# Patient Record
Sex: MALE | Race: BLACK OR AFRICAN AMERICAN | NOT HISPANIC OR LATINO | Employment: FULL TIME | URBAN - METROPOLITAN AREA
[De-identification: names, ages, dates, MRNs, and addresses within clinical notes are randomized per-mention and may not be internally consistent; named-entity substitution may affect disease eponyms.]

---

## 2017-05-17 ENCOUNTER — OFFICE VISIT (OUTPATIENT)
Dept: FAMILY MEDICINE | Facility: CLINIC | Age: 37
End: 2017-05-17
Payer: COMMERCIAL

## 2017-05-17 VITALS
SYSTOLIC BLOOD PRESSURE: 114 MMHG | WEIGHT: 228.19 LBS | DIASTOLIC BLOOD PRESSURE: 80 MMHG | BODY MASS INDEX: 33.8 KG/M2 | HEART RATE: 64 BPM | HEIGHT: 69 IN | TEMPERATURE: 98 F | OXYGEN SATURATION: 97 %

## 2017-05-17 DIAGNOSIS — M79.89 CYST OF SOFT TISSUE: ICD-10-CM

## 2017-05-17 DIAGNOSIS — L23.9 ALLERGIC DERMATITIS: Primary | ICD-10-CM

## 2017-05-17 PROCEDURE — 99213 OFFICE O/P EST LOW 20 MIN: CPT | Mod: S$GLB,,, | Performed by: FAMILY MEDICINE

## 2017-05-17 PROCEDURE — 1160F RVW MEDS BY RX/DR IN RCRD: CPT | Mod: S$GLB,,, | Performed by: FAMILY MEDICINE

## 2017-05-17 PROCEDURE — 99999 PR PBB SHADOW E&M-EST. PATIENT-LVL III: CPT | Mod: PBBFAC,,, | Performed by: FAMILY MEDICINE

## 2017-05-17 RX ORDER — TRIAMCINOLONE ACETONIDE 5 MG/G
CREAM TOPICAL 2 TIMES DAILY
Qty: 30 G | Refills: 0 | Status: SHIPPED | OUTPATIENT
Start: 2017-05-17 | End: 2018-03-13

## 2017-05-17 NOTE — PROGRESS NOTES
"Subjective:       Patient ID: Avelino Lyle is a 36 y.o. male.    Chief Complaint: Check knot on back and Rash Left Ear/ Itching    patient presents with concerns of a mass on his back. He states it feels smaller. He states he squeezed it and some of it came out. He state it is not painful.     He also has a rash at the top of his left ear that is pruritic. He has not tried anything to rid himself of this. He denies known contact with new lotions, creams, or detergents that my have caused this.       Review of Systems    Objective:       Vitals:    05/17/17 1111   BP: 114/80   Pulse: 64   Temp: 98.4 °F (36.9 °C)   TempSrc: Oral   SpO2: 97%   Weight: 103.5 kg (228 lb 2.8 oz)   Height: 5' 9" (1.753 m)       Physical Exam   Constitutional: He appears well-developed and well-nourished. No distress.   HENT:   Head: Normocephalic and atraumatic.   Skin: Rash noted. He is not diaphoretic.            Assessment:       1. Allergic dermatitis    2. Cyst of soft tissue        Plan:       .Avelino was seen today for check knot on back and rash left ear/ itching.    Diagnoses and all orders for this visit:    Allergic dermatitis  -     triamcinolone acetonide 0.5% (KENALOG) 0.5 % Crea; Apply topically 2 (two) times daily.    Cyst of soft tissue  He can have this removed it becomes painful or uncomfortable.        "

## 2018-03-13 ENCOUNTER — OFFICE VISIT (OUTPATIENT)
Dept: FAMILY MEDICINE | Facility: CLINIC | Age: 38
End: 2018-03-13
Payer: COMMERCIAL

## 2018-03-13 VITALS
WEIGHT: 220.69 LBS | RESPIRATION RATE: 16 BRPM | BODY MASS INDEX: 32.69 KG/M2 | TEMPERATURE: 99 F | HEIGHT: 69 IN | OXYGEN SATURATION: 99 % | HEART RATE: 64 BPM | SYSTOLIC BLOOD PRESSURE: 116 MMHG | DIASTOLIC BLOOD PRESSURE: 80 MMHG

## 2018-03-13 DIAGNOSIS — L08.9 INFECTED CYST OF SKIN: Primary | ICD-10-CM

## 2018-03-13 DIAGNOSIS — L72.9 INFECTED CYST OF SKIN: Primary | ICD-10-CM

## 2018-03-13 PROCEDURE — 99214 OFFICE O/P EST MOD 30 MIN: CPT | Mod: 25,S$GLB,, | Performed by: PHYSICIAN ASSISTANT

## 2018-03-13 PROCEDURE — 10060 I&D ABSCESS SIMPLE/SINGLE: CPT | Mod: S$GLB,,, | Performed by: PHYSICIAN ASSISTANT

## 2018-03-13 PROCEDURE — 99999 PR PBB SHADOW E&M-EST. PATIENT-LVL IV: CPT | Mod: PBBFAC,,, | Performed by: PHYSICIAN ASSISTANT

## 2018-03-13 RX ORDER — SULFAMETHOXAZOLE AND TRIMETHOPRIM 800; 160 MG/1; MG/1
1 TABLET ORAL 2 TIMES DAILY
Qty: 20 TABLET | Refills: 0 | Status: SHIPPED | OUTPATIENT
Start: 2018-03-13 | End: 2018-03-23

## 2018-03-13 RX ORDER — IBUPROFEN 800 MG/1
800 TABLET ORAL EVERY 6 HOURS PRN
Qty: 40 TABLET | Refills: 0 | Status: SHIPPED | OUTPATIENT
Start: 2018-03-13 | End: 2018-03-23

## 2018-03-13 NOTE — PATIENT INSTRUCTIONS
Abscess (Incision & Drainage)  An abscess is sometimes called a boil. It happens when bacteria get trapped under the skin and start to grow. Pus forms inside the abscess as the body responds to the bacteria. An abscess can happen with an insect bite, ingrown hair, blocked oil gland, pimple, cyst, or puncture wound.  Your healthcare provider has drained the pus from your abscess. If the abscess pocket was large, your healthcare provider may have put in gauze packing. Your provider will need to remove it on your next visit. He or she may also replace it at that time. You may not need antibiotics to treat a simple abscess, unless the infection is spreading into the skin around the wound (cellulitis).  The wound will take about 1 to 2 weeks to heal, depending on the size of the abscess. Healthy tissue will grow from the bottom and sides of the opening until it seals over.  Home care  These tips can help your wound heal:  · The wound may drain for the first 2 days. Cover the wound with a clean dry dressing. Change the dressing if it becomes soaked with blood or pus.  · If a gauze packing was placed inside the abscess pocket, you may be told to remove it yourself. You may do this in the shower. Once the packing is removed, you should wash the area in the shower, or clean the area as directed by your provider. Continue to do this until the skin opening has closed. Make sure you wash your hands after changing the packing or cleaning the wound.  · If you were prescribed antibiotics, take them as directed until they are all gone.  · You may use acetaminophen or ibuprofen to control pain, unless another pain medicine was prescribed. If you have liver disease or ever had a stomach ulcer, talk with your doctor before using these medicines.  Follow-up care  Follow up with your healthcare provider, or as advised. If a gauze packing was put in your wound, it should be removed in 1 to 2 days. Check your wound every day for any  signs that the infection is getting worse. The signs are listed below.  When to seek medical advice  Call your healthcare provider right away if any of these occur:  · Increasing redness or swelling  · Red streaks in the skin leading away from the wound  · Increasing local pain or swelling  · Continued pus draining from the wound 2 days after treatment  · Fever of 100.4ºF (38ºC) or higher, or as directed by your healthcare provider  · Boil returns when you are at home  Date Last Reviewed: 9/1/2016  © 5477-8691 [employer]. [address] [place] [address]. All rights reserved. This information is not intended as a substitute for professional medical care. Always follow your healthcare professional's instructions.

## 2018-03-13 NOTE — PROGRESS NOTES
"Subjective:       Patient ID: Avelino Lyle is a 37 y.o. male with multiple medical diagnoses as listed in the medical history and problem list that presents for abcess (on back burst sunday, still draining)  .    Chief Complaint: abcess (on back burst sunday, still draining)      Abscess   Chronicity:  ChronicProgression Since Onset: gradually worsening  Abscess location: back   Associated Symptoms: no fever, no chills  Characteristics: draining and painful    Pain Scale:  8/10  Ineffective treatments: hot towel and squeezing ; peroxide.    Review of Systems   Constitutional: Negative for chills and fever.   Skin: Positive for wound.   Neurological: Negative for headaches.         PAST MEDICAL HISTORY:  History reviewed. No pertinent past medical history.    SOCIAL HISTORY:  Social History     Social History    Marital status:      Spouse name: N/A    Number of children: N/A    Years of education: N/A     Occupational History     [employer]     Social History Main Topics    Smoking status: Never Smoker    Smokeless tobacco: Not on file    Alcohol use No    Drug use: No    Sexual activity: Yes     Partners: Female     Other Topics Concern    Not on file     Social History Narrative    No narrative on file       ALLERGIES AND MEDICATIONS: updated and reviewed.  Review of patient's allergies indicates:   Allergen Reactions    Iodine and iodide containing products Anaphylaxis     Current Outpatient Prescriptions   Medication Sig Dispense Refill    ibuprofen (ADVIL,MOTRIN) 800 MG tablet Take 1 tablet (800 mg total) by mouth every 6 (six) hours as needed for Pain. 40 tablet 0    sulfamethoxazole-trimethoprim 800-160mg (BACTRIM DS) 800-160 mg Tab Take 1 tablet by mouth 2 (two) times daily. 20 tablet 0     No current facility-administered medications for this visit.          Objective:   /80   Pulse 64   Temp 98.6 °F (37 °C) (Oral)   Resp 16   Ht 5' 9" (1.753 m)   Wt 100.1 kg " (220 lb 10.9 oz)   SpO2 99%   BMI 32.59 kg/m²      Physical Exam   Constitutional: He is oriented to person, place, and time. No distress.   Cardiovascular: Normal rate and regular rhythm.    Pulmonary/Chest: Effort normal and breath sounds normal.   Musculoskeletal: Normal range of motion.   Neurological: He is alert and oriented to person, place, and time.   Skin: Rash noted.         After obtaining informed written consent, the upper mid back area was clean with chroloprep and draped in sterile fashion.  1% lidocaine with epi was used to anesthetize the area. An [11 ] blade was then used to make an incision and minimal [bloody] drainage was expressed. Capsule was removed. The area was then dressed with gauze and telfa.  Pt tolerated procedure well. Wound care instructions given verbally.       Assessment:       1. Infected cyst of skin        Plan:       Infected cyst of skin  -     sulfamethoxazole-trimethoprim 800-160mg (BACTRIM DS) 800-160 mg Tab; Take 1 tablet by mouth 2 (two) times daily.  Dispense: 20 tablet; Refill: 0  -     ibuprofen (ADVIL,MOTRIN) 800 MG tablet; Take 1 tablet (800 mg total) by mouth every 6 (six) hours as needed for Pain.  Dispense: 40 tablet; Refill: 0    return in 2 days.   Advised to go home eat and take medications.         No Follow-up on file.

## 2018-03-13 NOTE — LETTER
March 13, 2018         [facility]  Family Medicine  [address]  [address]  [place] [address]  Phone: [phone]  Fax: [phone]   March 13, 2018     Patient: Avelino Lyle   YOB: 1980   Date of Visit: 3/13/2018       To Whom it May Concern:    Avelino Lyle was seen in my clinic on 3/13/2018. He may return to work on 3/16/18.    If you have any questions or concerns, please don't hesitate to call.    Sincerely,         ADRIEL Nicholson

## 2018-03-15 ENCOUNTER — OFFICE VISIT (OUTPATIENT)
Dept: FAMILY MEDICINE | Facility: CLINIC | Age: 38
End: 2018-03-15
Payer: COMMERCIAL

## 2018-03-15 ENCOUNTER — TELEPHONE (OUTPATIENT)
Dept: FAMILY MEDICINE | Facility: CLINIC | Age: 38
End: 2018-03-15

## 2018-03-15 VITALS — BODY MASS INDEX: 32.33 KG/M2 | HEIGHT: 69 IN | WEIGHT: 218.25 LBS | TEMPERATURE: 98 F

## 2018-03-15 DIAGNOSIS — L08.9 INFECTED CYST OF SKIN: Primary | ICD-10-CM

## 2018-03-15 DIAGNOSIS — L72.9 INFECTED CYST OF SKIN: Primary | ICD-10-CM

## 2018-03-15 PROCEDURE — 99024 POSTOP FOLLOW-UP VISIT: CPT | Mod: S$GLB,,, | Performed by: PHYSICIAN ASSISTANT

## 2018-03-15 PROCEDURE — 99999 PR PBB SHADOW E&M-EST. PATIENT-LVL II: CPT | Mod: PBBFAC,,, | Performed by: PHYSICIAN ASSISTANT

## 2018-03-15 NOTE — TELEPHONE ENCOUNTER
----- Message from Emily Mondragon sent at 3/15/2018 12:51 PM CDT -----  Contact: Self  Pt called to reschedule post-op. Pt can be reached @ [phone].

## 2018-03-15 NOTE — LETTER
March 15, 2018                 [facility]  Family Medicine  [address]  [address]  [place] [address]  Phone: [phone]  Fax: [phone]   March 15, 2018     Patient: Avelino Lyle   YOB: 1980   Date of Visit: 3/15/2018       To Whom it May Concern:    Avelino Lyle was seen in my clinic on 3/15/2018. He may return to work on 3/19/18.    If you have any questions or concerns, please don't hesitate to call.    Sincerely,         ADRIEL Nicholson

## 2018-03-15 NOTE — PROGRESS NOTES
"Subjective:       Patient ID: Avelino Lyle is a 37 y.o. male with multiple medical diagnoses as listed in the medical history and problem list that presents for Wound Check  .    Chief Complaint: Wound Check      HPI   Pt here to follow up for infected cyst. The cyst had drained prior to visit but patient still with pain so we went ahead to remove the capsule. He has been on bactrim. He states it was still draining when he removed his dressing. He is still in pain and the ibuprofen 800 is not helping.     Review of Systems   Constitutional: Negative for appetite change, fatigue and fever.   Skin: Positive for wound (pain ).         PAST MEDICAL HISTORY:  History reviewed. No pertinent past medical history.    SOCIAL HISTORY:  Social History     Social History    Marital status:      Spouse name: N/A    Number of children: N/A    Years of education: N/A     Occupational History     [employer]     Social History Main Topics    Smoking status: Never Smoker    Smokeless tobacco: Not on file    Alcohol use No    Drug use: No    Sexual activity: Yes     Partners: Female     Other Topics Concern    Not on file     Social History Narrative    No narrative on file       ALLERGIES AND MEDICATIONS: updated and reviewed.  Review of patient's allergies indicates:   Allergen Reactions    Iodine and iodide containing products Anaphylaxis     Current Outpatient Prescriptions   Medication Sig Dispense Refill    ibuprofen (ADVIL,MOTRIN) 800 MG tablet Take 1 tablet (800 mg total) by mouth every 6 (six) hours as needed for Pain. 40 tablet 0    sulfamethoxazole-trimethoprim 800-160mg (BACTRIM DS) 800-160 mg Tab Take 1 tablet by mouth 2 (two) times daily. 20 tablet 0     No current facility-administered medications for this visit.          Objective:   Temp 97.9 °F (36.6 °C) (Oral)   Ht 5' 9" (1.753 m)   Wt 99 kg (218 lb 4.1 oz)   BMI 32.23 kg/m²      Physical Exam      The area is tender to " touch.  No warmth or induration.       Assessment:       1. Infected cyst of skin        Plan:       Infected cyst of skin  Remain out of work until Monday.  Continue bactrim.   Will get DR. Bowens to send him some tramadol for the pain.           No Follow-up on file.

## 2018-03-16 RX ORDER — TRAMADOL HYDROCHLORIDE 50 MG/1
50 TABLET ORAL EVERY 6 HOURS PRN
Qty: 20 TABLET | Refills: 0 | Status: SHIPPED | OUTPATIENT
Start: 2018-03-16 | End: 2018-03-26

## 2018-06-04 ENCOUNTER — OFFICE VISIT (OUTPATIENT)
Dept: FAMILY MEDICINE | Facility: CLINIC | Age: 38
End: 2018-06-04
Payer: COMMERCIAL

## 2018-06-04 VITALS
TEMPERATURE: 99 F | DIASTOLIC BLOOD PRESSURE: 78 MMHG | WEIGHT: 213.19 LBS | HEIGHT: 69 IN | BODY MASS INDEX: 31.58 KG/M2 | SYSTOLIC BLOOD PRESSURE: 130 MMHG | OXYGEN SATURATION: 99 % | HEART RATE: 70 BPM

## 2018-06-04 DIAGNOSIS — M75.52 ACUTE SHOULDER BURSITIS, LEFT: Primary | ICD-10-CM

## 2018-06-04 PROCEDURE — 3008F BODY MASS INDEX DOCD: CPT | Mod: CPTII,S$GLB,, | Performed by: FAMILY MEDICINE

## 2018-06-04 PROCEDURE — 96372 THER/PROPH/DIAG INJ SC/IM: CPT | Mod: S$GLB,,, | Performed by: FAMILY MEDICINE

## 2018-06-04 PROCEDURE — 99999 PR PBB SHADOW E&M-EST. PATIENT-LVL III: CPT | Mod: PBBFAC,,, | Performed by: FAMILY MEDICINE

## 2018-06-04 PROCEDURE — 99214 OFFICE O/P EST MOD 30 MIN: CPT | Mod: 25,S$GLB,, | Performed by: FAMILY MEDICINE

## 2018-06-04 RX ORDER — METHYLPREDNISOLONE ACETATE 80 MG/ML
80 INJECTION, SUSPENSION INTRA-ARTICULAR; INTRALESIONAL; INTRAMUSCULAR; SOFT TISSUE
Status: COMPLETED | OUTPATIENT
Start: 2018-06-04 | End: 2018-06-04

## 2018-06-04 RX ORDER — METHYLPREDNISOLONE 4 MG/1
TABLET ORAL
Qty: 1 PACKAGE | Refills: 0 | Status: SHIPPED | OUTPATIENT
Start: 2018-06-04 | End: 2018-08-12

## 2018-06-04 RX ADMIN — METHYLPREDNISOLONE ACETATE 80 MG: 80 INJECTION, SUSPENSION INTRA-ARTICULAR; INTRALESIONAL; INTRAMUSCULAR; SOFT TISSUE at 02:06

## 2018-06-04 NOTE — PROGRESS NOTES
"Subjective:       Patient ID: Avelino Lyle is a 37 y.o. male.    Chief Complaint: Left Arm/ Shoulder Pain    Shoulder Pain    The pain is present in the left shoulder. This is a new (LEFT SHOULDER PAIN WITH LEANIN ON THE LEFT SHOULDER. HE WORKS AS A  AND HAS SWELLING AND LIMITED RANGE OF MOTION) problem. The current episode started in the past 7 days. There has been no history of extremity trauma. The pain is moderate. Associated symptoms include a limited range of motion and stiffness. Pertinent negatives include no inability to bear weight. The treatment provided no relief.     Review of Systems   Musculoskeletal: Positive for stiffness.       Objective:       Vitals:    06/04/18 1336   BP: 130/78   Pulse: 70   Temp: 98.8 °F (37.1 °C)   TempSrc: Oral   SpO2: 99%   Weight: 96.7 kg (213 lb 3 oz)   Height: 5' 9" (1.753 m)       Physical Exam   Constitutional: He is oriented to person, place, and time. He appears well-developed and well-nourished. No distress.   HENT:   Head: Normocephalic and atraumatic.   Neck: Normal range of motion. Neck supple.   Musculoskeletal:        Left shoulder: He exhibits decreased range of motion, tenderness, bony tenderness, swelling, effusion and pain. He exhibits no deformity and normal strength.   Neurological: He is alert and oriented to person, place, and time.   Skin: He is not diaphoretic.       Assessment:       1. Acute shoulder bursitis, left        Plan:       Avelino was seen today for left arm/ shoulder pain.    Diagnoses and all orders for this visit:    Acute shoulder bursitis, left  -     methylPREDNISolone acetate injection 80 mg; Inject 1 mL (80 mg total) into the muscle one time.  -     methylPREDNISolone (MEDROL DOSEPACK) 4 mg tablet; use as directed    Other orders  -     Cancel: CBC auto differential; Future  -     Cancel: Comprehensive metabolic panel; Future  -     Cancel: Lipid panel; Future            [phone] FAX  "

## 2018-06-04 NOTE — MEDICAL/APP STUDENT
Subjective:       Patient ID: Avelino Lyle is a 37 y.o. male.    Chief Complaint: Left Arm/ Shoulder Pain    HPI   Mr. Lyle is a 36 yo M who presents with worsening left shoulder pain rated 10/10 that began 3 weeks ago. He works as a  which requires significant manual labor and lying on his left shoulder. He reports significant pain when lying on his left shoulder for work and sleep. He denies any previous shoulder pain. He denies taking any pain medication.     Review of Systems    No other significant symptoms.  Objective:       Vitals:    06/04/18 1336   BP: 130/78   Pulse: 70   Temp: 98.8 °F (37.1 °C)     Physical Exam    Constitutional: He appears well-developed and well-nourished. He appears older than his age.  Cardiovascular: Normal rate, regular rhythm, normal heart sounds and intact distal pulses.    Pulmonary/Chest: Effort normal and breath sounds normal.   Abdominal: Soft. Bowel sounds are normal.    Neurological: He is alert and oriented to person, place, and time.   Skin: No rash.  MSK: Swelling over left shoulder. Enlarged subacromial bursa palpated. Patient can't abduct passively or actively past 45 degrees without pain.  Positive Hawkin's sign and empty can sign in left shoulder.   Assessment:       36 yo M with L shoulder pain.  No diagnosis found.    Plan:       Left Subacromial Bursitis  1. Corticosteroid shot in clinic  2. Corticosteroid pack  3. One week off work  4. Rest, Ice, and Ibuprofen

## 2018-06-04 NOTE — PROGRESS NOTES
Administered Depo - Medrol 80 mg IM to left deltoid.  Patient tolerated injection well, no adverse reactions noted.

## 2018-08-12 ENCOUNTER — HOSPITAL ENCOUNTER (EMERGENCY)
Facility: HOSPITAL | Age: 38
Discharge: HOME | End: 2018-08-12
Attending: EMERGENCY MEDICINE
Payer: COMMERCIAL

## 2018-08-12 VITALS
OXYGEN SATURATION: 98 % | RESPIRATION RATE: 18 BRPM | BODY MASS INDEX: 34.07 KG/M2 | TEMPERATURE: 98 F | HEART RATE: 79 BPM | HEIGHT: 69 IN | SYSTOLIC BLOOD PRESSURE: 138 MMHG | DIASTOLIC BLOOD PRESSURE: 73 MMHG | WEIGHT: 230 LBS

## 2018-08-12 DIAGNOSIS — S01.01XA SCALP LACERATION, INITIAL ENCOUNTER: ICD-10-CM

## 2018-08-12 DIAGNOSIS — L72.3 INFECTED SEBACEOUS CYST OF SKIN: Primary | ICD-10-CM

## 2018-08-12 DIAGNOSIS — L08.9 INFECTED SEBACEOUS CYST OF SKIN: Primary | ICD-10-CM

## 2018-08-12 PROCEDURE — 25000003 PHARM REV CODE 250: Performed by: PHYSICIAN ASSISTANT

## 2018-08-12 PROCEDURE — 99283 EMERGENCY DEPT VISIT LOW MDM: CPT

## 2018-08-12 PROCEDURE — 99283 EMERGENCY DEPT VISIT LOW MDM: CPT | Mod: ,,, | Performed by: PHYSICIAN ASSISTANT

## 2018-08-12 RX ORDER — ACETAMINOPHEN 500 MG
1000 TABLET ORAL
Status: COMPLETED | OUTPATIENT
Start: 2018-08-12 | End: 2018-08-12

## 2018-08-12 RX ORDER — ACETAMINOPHEN 500 MG
1000 TABLET ORAL EVERY 6 HOURS PRN
Qty: 50 TABLET | Refills: 0 | Status: SHIPPED | OUTPATIENT
Start: 2018-08-12 | End: 2023-09-22

## 2018-08-12 RX ORDER — SULFAMETHOXAZOLE AND TRIMETHOPRIM 800; 160 MG/1; MG/1
1 TABLET ORAL 2 TIMES DAILY
Qty: 14 TABLET | Refills: 0 | Status: SHIPPED | OUTPATIENT
Start: 2018-08-12 | End: 2018-08-17

## 2018-08-12 RX ADMIN — ACETAMINOPHEN 1000 MG: 500 TABLET ORAL at 08:08

## 2018-08-13 NOTE — DISCHARGE INSTRUCTIONS
Please take entire course of Bactrim DS prescribed x 5 days. Take oral Tylenol as need for headache and scalp contusion. Follow up with your PCP following completion of antibiotics to arrange excision of cyst of back. Return to ED with any change or worsening in current condition.

## 2018-08-13 NOTE — ED PROVIDER NOTES
Encounter Date: 8/12/2018  38 y.o.      History     Chief Complaint   Patient presents with    Abscess     Pt states he has a cyst on his back that was removed previously and now present again with drainage starting on friday.     Head Injury     Pt states his back sigala of his vechicle came down and hit is posterior head. Pt c/o HA and laceration noted; no bleeding. Denies LOC or vision changes.      38 y.o. M with no significant PMH presents to the ED with chief complaint x 2, draining cyst of left upper back and scalp laceration from injury > 24 hours ago. He reports the upper back cyst is recurrent with I and D in 03/2018, 1 week ago he reports the cyst became inflamed and spontaneously drained x 3 days ago. Currently with cyst of Left upper back with minimal drainage with manipulation. There is no surrounding erythema, fluctuance, warmth and NTTP. He also reports laceration to scalp secondary to trunk closing on his head yesterday morning, denies LOC or vision disturbances. Neuro examination is normal with no amnesia, ataxia, cranial or sensory nerve deficits on exam. He reports a dull headache since noon today, but has not taken anything for relief. 3cm superficial laceration of posterior scalp in closed, no erythema or drainage. He does have TTP with mild edema surrounding the laceration. He denies chest pain, SOB, nausea, emesis, fatigue, malaise or confusion.           Review of patient's allergies indicates:   Allergen Reactions    Iodine and iodide containing products Anaphylaxis     History reviewed. No pertinent past medical history.  Past Surgical History:   Procedure Laterality Date    FEMUR SURGERY       Family History   Problem Relation Age of Onset    Diabetes Mother     Hypertension Mother      Social History     Tobacco Use    Smoking status: Never Smoker   Substance Use Topics    Alcohol use: No    Drug use: No     Review of Systems   Constitutional: Negative for activity change,  appetite change, chills, diaphoresis, fatigue and fever.   HENT: Negative for congestion, ear pain, facial swelling, sinus pain, trouble swallowing and voice change.    Eyes: Negative for photophobia, pain and visual disturbance.   Respiratory: Negative for apnea, chest tightness and shortness of breath.    Cardiovascular: Negative for chest pain and palpitations.   Gastrointestinal: Negative for abdominal pain.   Genitourinary: Negative for dysuria.   Musculoskeletal: Negative for arthralgias, gait problem, myalgias and neck pain.   Skin: Positive for wound (posterior scalp, upper back ). Negative for color change, pallor and rash.   Neurological: Positive for headaches. Negative for dizziness, tremors, seizures, syncope, facial asymmetry, speech difficulty, weakness, light-headedness and numbness.   Psychiatric/Behavioral: Negative for agitation and behavioral problems. The patient is not nervous/anxious.    All other systems reviewed and are negative.      Physical Exam     Initial Vitals [08/12/18 1926]   BP Pulse Resp Temp SpO2   138/73 79 18 98.3 °F (36.8 °C) 98 %      MAP       --         Physical Exam    Nursing note and vitals reviewed.  Constitutional: He appears well-developed and well-nourished. No distress.   HENT:   Head: Normocephalic and atraumatic.       3cm closed/superficial laceration.    Eyes: Conjunctivae and EOM are normal. Pupils are equal, round, and reactive to light.   Neck: Normal range of motion.   Cardiovascular: Normal rate, regular rhythm and normal heart sounds.   Pulmonary/Chest: Breath sounds normal. No respiratory distress. He has no wheezes. He has no rales.   Abdominal: Soft. Bowel sounds are normal. There is no tenderness.   Musculoskeletal: Normal range of motion.   Neurological: He is alert and oriented to person, place, and time. He has normal strength. He is not disoriented. He displays no atrophy and no tremor. No cranial nerve deficit or sensory deficit. He exhibits  normal muscle tone. He displays a negative Romberg sign. Coordination and gait normal.   Skin: Skin is warm, dry and intact. Capillary refill takes less than 2 seconds. Lesion (draining cyst - upper back) noted. No abrasion, no bruising, no ecchymosis, no rash and no abscess noted. Laceration: 3cm - posterior scalp/closed. No erythema. No pallor.        1cm x 1cm draining sebaceous cyst - right upper back    Psychiatric: He has a normal mood and affect.         ED Course   Procedures  Labs Reviewed - No data to display       Imaging Results    None          Medical Decision Making:   Initial Assessment:   38 y.o. M with no significant PMHx presents with with chief complaint x 2, draining sebaceous of right upper back and superficial laceration of posterior scalp secondary to trauma > 24 hours. Cyst is recurrent, he had and I and D in 03/2018 and was placed on PO Bactrim DS. No sign of active infection today, will prescribe PO Bactrim DS.     Posterior scalp laceration secondary to trauma from trunk that fell on his head yesterday morning. He denies LOC, vision disturbances or dizziness. Laceration is 3cm and superficial. There is no drainage or surrounding erythema. There is mild tenderness to palpation with mild edema. He reports a dull headache which started at noon today. He has not taken anything for his HA.    Considered concussion or head trauma but did not feel that CT scan was warranted at this time as he is greater than 24 hours from trauma and denies LOC, vision disturbances, amnesia, ataxia, nausea or emesis. Neuro exam is completely normal.     Tylenol 1000mg PO given in ED. Discharged home with prescription for Bactrim DS and Tylenol. Recommend he cover the scalp laceration and cyst with Band Aid when outdoors. Can apply Ice to scalp laceration, mindful to apply protective barrier between ice and skin. Recommend he follow up with his PCP for definitive treatment/excision of cyst.     I discussed the  care of this patient with my supervising MD.    Differential Diagnosis:   Abscess, infected sebaceous cyst, insect bite    Concussion, head injury, migraine                      Clinical Impression:   The primary encounter diagnosis was Infected sebaceous cyst of skin. A diagnosis of Scalp laceration, initial encounter was also pertinent to this visit.      Disposition:   Disposition: Discharged  Condition: Stable                        ADRIEL Wagoner  08/12/18 2059

## 2018-08-13 NOTE — ED NOTES
Patient identifiers verified and correct for Mr Lyle  C/C: Abscess and head injury, laceration to top head  APPEARANCE: awake and alert in NAD.  SKIN: warm, dry, small area to mid upper back with healed center with min redness or warmth, no evident drainage  MUSCULOSKELETAL: Patient moving all extremities spontaneously, no obvious swelling or deformities noted. Ambulates independently.  RESPIRATORY: Denies shortness of breath.Respirations unlabored.   CARDIAC: Denies CP, 2+ distal pulses; no peripheral edema  ABDOMEN: S/ND/NT, Denies nausea  : voids spontaneously, denies difficulty  Neurologic: AAO x 4; follows commands equal strength in all extremities; denies numbness/tingling. Denies dizziness Positive lihjtheaded, positive gen weakness, positive headache no open areas, deneis nausea.

## 2018-08-13 NOTE — ED TRIAGE NOTES
"Patient states his PCP cut a cyst off his back 2 months ago, after being on back for "years" came back  on Friday with pain and yellow drainage, denies fever. ALso concerned that ruiz of car hit the top of his head yesterday, positive headache, denies nausea, Denies LOC. Laceration to top of head  "

## 2020-09-09 ENCOUNTER — OFFICE VISIT (OUTPATIENT)
Dept: FAMILY MEDICINE | Facility: CLINIC | Age: 40
End: 2020-09-09
Payer: COMMERCIAL

## 2020-09-09 VITALS
SYSTOLIC BLOOD PRESSURE: 118 MMHG | HEIGHT: 69 IN | BODY MASS INDEX: 34.35 KG/M2 | DIASTOLIC BLOOD PRESSURE: 84 MMHG | OXYGEN SATURATION: 98 % | HEART RATE: 96 BPM | TEMPERATURE: 99 F | WEIGHT: 231.94 LBS

## 2020-09-09 DIAGNOSIS — B35.3 TINEA PEDIS OF BOTH FEET: ICD-10-CM

## 2020-09-09 DIAGNOSIS — B34.9 SYSTEMIC VIRAL ILLNESS: Primary | ICD-10-CM

## 2020-09-09 PROCEDURE — 99214 PR OFFICE/OUTPT VISIT, EST, LEVL IV, 30-39 MIN: ICD-10-PCS | Mod: S$GLB,,, | Performed by: INTERNAL MEDICINE

## 2020-09-09 PROCEDURE — 3008F PR BODY MASS INDEX (BMI) DOCUMENTED: ICD-10-PCS | Mod: CPTII,S$GLB,, | Performed by: INTERNAL MEDICINE

## 2020-09-09 PROCEDURE — 99999 PR PBB SHADOW E&M-EST. PATIENT-LVL III: CPT | Mod: PBBFAC,,, | Performed by: INTERNAL MEDICINE

## 2020-09-09 PROCEDURE — 99999 PR PBB SHADOW E&M-EST. PATIENT-LVL III: ICD-10-PCS | Mod: PBBFAC,,, | Performed by: INTERNAL MEDICINE

## 2020-09-09 PROCEDURE — 99214 OFFICE O/P EST MOD 30 MIN: CPT | Mod: S$GLB,,, | Performed by: INTERNAL MEDICINE

## 2020-09-09 PROCEDURE — 3008F BODY MASS INDEX DOCD: CPT | Mod: CPTII,S$GLB,, | Performed by: INTERNAL MEDICINE

## 2020-09-09 RX ORDER — ECONAZOLE NITRATE 10 MG/G
CREAM TOPICAL 2 TIMES DAILY
Qty: 30 G | Refills: 2 | Status: SHIPPED | OUTPATIENT
Start: 2020-09-09 | End: 2023-09-22

## 2020-09-09 RX ORDER — TRIAMCINOLONE ACETONIDE 1 MG/G
OINTMENT TOPICAL 2 TIMES DAILY
Qty: 30 G | Refills: 3 | Status: SHIPPED | OUTPATIENT
Start: 2020-09-09 | End: 2020-09-09

## 2020-09-09 NOTE — PROGRESS NOTES
SUBJECTIVE     Chief Complaint   Patient presents with    Follow-up       HPI  Avelino Lyle is a 40 y.o. male with multiple medical diagnoses as listed in the medical history and problem list that presents for evaluation of URI since 9/1/2020. Pt reports he developed a productive cough, chills, body aches, and a fever(temp of 110). Denied any night sweats. Pt has been taking Tessalon Perles and he had improvement of symptoms. Denies any recent travel. +sick contacts(nieces/nephews with cold). Of note, pt went to [facility] and tested negative for COVID19.    PAST MEDICAL HISTORY:  History reviewed. No pertinent past medical history.    PAST SURGICAL HISTORY:  Past Surgical History:   Procedure Laterality Date    FEMUR SURGERY         SOCIAL HISTORY:  Social History     Socioeconomic History    Marital status:      Spouse name: Not on file    Number of children: Not on file    Years of education: Not on file    Highest education level: Not on file   Occupational History     Employer: [employer]   Social Needs    Financial resource strain: Not on file    Food insecurity     Worry: Not on file     Inability: Not on file    Transportation needs     Medical: Not on file     Non-medical: Not on file   Tobacco Use    Smoking status: Never Smoker   Substance and Sexual Activity    Alcohol use: No    Drug use: No    Sexual activity: Yes     Partners: Female   Lifestyle    Physical activity     Days per week: Not on file     Minutes per session: Not on file    Stress: Not at all   Relationships    Social connections     Talks on phone: Not on file     Gets together: Not on file     Attends [employer] service: Not on file     Active member of club or organization: Not on file     Attends meetings of clubs or organizations: Not on file     Relationship status: Not on file   Other Topics Concern    Not on file   Social History Narrative    Not on file       FAMILY HISTORY:  Family  "History   Problem Relation Age of Onset    Diabetes Mother     Hypertension Mother        ALLERGIES AND MEDICATIONS: updated and reviewed.  Review of patient's allergies indicates:   Allergen Reactions    Iodine and iodide containing products Anaphylaxis     Current Outpatient Medications   Medication Sig Dispense Refill    acetaminophen (TYLENOL) 500 MG tablet Take 2 tablets (1,000 mg total) by mouth every 6 (six) hours as needed for Pain. (Patient not taking: Reported on 9/9/2020) 50 tablet 0    econazole nitrate 1 % cream Apply topically 2 (two) times daily. 30 g 2     No current facility-administered medications for this visit.        ROS  Review of Systems   Constitutional: Negative for chills and fever.   HENT: Negative for hearing loss and sore throat.    Eyes: Negative for visual disturbance.   Respiratory: Positive for cough. Negative for chest tightness, shortness of breath and wheezing.    Cardiovascular: Negative for chest pain, palpitations and leg swelling.   Gastrointestinal: Negative for abdominal pain, constipation, diarrhea, nausea and vomiting.   Genitourinary: Negative for dysuria, frequency and urgency.   Musculoskeletal: Negative for arthralgias, joint swelling and myalgias.   Skin: Negative for rash and wound.   Neurological: Negative for headaches.   Psychiatric/Behavioral: Negative for agitation and confusion. The patient is not nervous/anxious.          OBJECTIVE     Physical Exam  Vitals:    09/09/20 1406   BP: 118/84   Pulse: 96   Temp: 98.5 °F (36.9 °C)    Body mass index is 34.25 kg/m².  Weight: 105.2 kg (231 lb 14.8 oz)   Height: 5' 9" (175.3 cm)     Physical Exam  Constitutional:       General: He is not in acute distress.     Appearance: He is well-developed.   HENT:      Head: Normocephalic and atraumatic.      Right Ear: External ear normal.      Left Ear: External ear normal.      Nose: Nose normal.   Eyes:      General: No scleral icterus.        Right eye: No discharge.    "      Left eye: No discharge.      Conjunctiva/sclera: Conjunctivae normal.   Neck:      Musculoskeletal: Normal range of motion and neck supple.      Vascular: No JVD.      Trachea: No tracheal deviation.   Cardiovascular:      Rate and Rhythm: Normal rate and regular rhythm.      Heart sounds: Normal heart sounds. No murmur. No friction rub. No gallop.    Pulmonary:      Effort: Pulmonary effort is normal. No respiratory distress.      Breath sounds: Normal breath sounds. No wheezing.   Abdominal:      General: Bowel sounds are normal. There is no distension.      Palpations: Abdomen is soft. There is no mass.      Tenderness: There is no abdominal tenderness. There is no guarding or rebound.   Musculoskeletal: Normal range of motion.         General: No tenderness or deformity.   Skin:     General: Skin is warm and dry.      Findings: No erythema or rash.      Comments: Skin desquamation at plantar surface of toe webs   Neurological:      Mental Status: He is alert and oriented to person, place, and time.      Motor: No abnormal muscle tone.      Coordination: Coordination normal.   Psychiatric:         Behavior: Behavior normal.         Thought Content: Thought content normal.         Judgment: Judgment normal.           Health Maintenance       Date Due Completion Date    Hepatitis C Screening 1980 ---    Lipid Panel 1980 ---    HIV Screening 06/13/1996 ---    TETANUS VACCINE 06/13/1999 ---    Influenza Vaccine (1) 08/01/2020 ---            ASSESSMENT     40 y.o. male with     1. Systemic viral illness    2. Tinea pedis of both feet        PLAN:     1. Systemic viral illness  - Improved  - Continue symptomatic treatment with rest, increase fluid intake, tylenol or ibuprofen PRN fever(temp >/= 100.4) or body aches. Okay to take OTC antihistamines, i.e. Bendaryl, Claritin, Allegra, etc. as needed.  - Okay to gargle with warm, salt water or use throat lozenges as needed    2. Tinea pedis of both feet  -  Pt to resume antifungal as below  - econazole nitrate 1 % cream; Apply topically 2 (two) times daily.  Dispense: 30 g; Refill: 2        RTC in 1-2 weeks as needed for any acute worsening of current condition or failure to improve        Emilee Webb MD  09/09/2020 2:32 PM        No follow-ups on file.

## 2020-09-09 NOTE — LETTER
September 9, 2020      [address] [place] [address] [address] [place] [address]  Phone: [phone]  Fax: [phone]       Patient: Avelino Lyle   YOB: 1980  Date of Visit: 09/09/2020    To Whom It May Concern:    Francesca Lyle  was at [facility] on 09/09/2020. He may return to work/school on 9/14/2020 with no restrictions. If you have any questions or concerns, or if I can be of further assistance, please do not hesitate to contact me.    Sincerely,    Emilee Webb MD

## 2023-09-22 ENCOUNTER — OFFICE VISIT (OUTPATIENT)
Dept: FAMILY MEDICINE | Facility: CLINIC | Age: 43
End: 2023-09-22
Payer: COMMERCIAL

## 2023-09-22 VITALS
DIASTOLIC BLOOD PRESSURE: 92 MMHG | OXYGEN SATURATION: 96 % | HEART RATE: 73 BPM | SYSTOLIC BLOOD PRESSURE: 118 MMHG | WEIGHT: 226.44 LBS | TEMPERATURE: 99 F | BODY MASS INDEX: 33.44 KG/M2

## 2023-09-22 DIAGNOSIS — Z11.59 NEED FOR HEPATITIS C SCREENING TEST: ICD-10-CM

## 2023-09-22 DIAGNOSIS — R10.13 EPIGASTRIC PAIN: Primary | ICD-10-CM

## 2023-09-22 DIAGNOSIS — Z11.4 SCREENING FOR HIV (HUMAN IMMUNODEFICIENCY VIRUS): ICD-10-CM

## 2023-09-22 DIAGNOSIS — Z00.00 ANNUAL PHYSICAL EXAM: ICD-10-CM

## 2023-09-22 PROCEDURE — 3074F SYST BP LT 130 MM HG: CPT | Mod: CPTII,S$GLB,, | Performed by: INTERNAL MEDICINE

## 2023-09-22 PROCEDURE — 3074F PR MOST RECENT SYSTOLIC BLOOD PRESSURE < 130 MM HG: ICD-10-PCS | Mod: CPTII,S$GLB,, | Performed by: INTERNAL MEDICINE

## 2023-09-22 PROCEDURE — 99214 PR OFFICE/OUTPT VISIT, EST, LEVL IV, 30-39 MIN: ICD-10-PCS | Mod: S$GLB,,, | Performed by: INTERNAL MEDICINE

## 2023-09-22 PROCEDURE — 99999 PR PBB SHADOW E&M-EST. PATIENT-LVL IV: CPT | Mod: PBBFAC,,, | Performed by: INTERNAL MEDICINE

## 2023-09-22 PROCEDURE — 1160F RVW MEDS BY RX/DR IN RCRD: CPT | Mod: CPTII,S$GLB,, | Performed by: INTERNAL MEDICINE

## 2023-09-22 PROCEDURE — 99214 OFFICE O/P EST MOD 30 MIN: CPT | Mod: S$GLB,,, | Performed by: INTERNAL MEDICINE

## 2023-09-22 PROCEDURE — 3080F DIAST BP >= 90 MM HG: CPT | Mod: CPTII,S$GLB,, | Performed by: INTERNAL MEDICINE

## 2023-09-22 PROCEDURE — 3008F BODY MASS INDEX DOCD: CPT | Mod: CPTII,S$GLB,, | Performed by: INTERNAL MEDICINE

## 2023-09-22 PROCEDURE — 1160F PR REVIEW ALL MEDS BY PRESCRIBER/CLIN PHARMACIST DOCUMENTED: ICD-10-PCS | Mod: CPTII,S$GLB,, | Performed by: INTERNAL MEDICINE

## 2023-09-22 PROCEDURE — 99999 PR PBB SHADOW E&M-EST. PATIENT-LVL IV: ICD-10-PCS | Mod: PBBFAC,,, | Performed by: INTERNAL MEDICINE

## 2023-09-22 PROCEDURE — 1159F PR MEDICATION LIST DOCUMENTED IN MEDICAL RECORD: ICD-10-PCS | Mod: CPTII,S$GLB,, | Performed by: INTERNAL MEDICINE

## 2023-09-22 PROCEDURE — 1159F MED LIST DOCD IN RCRD: CPT | Mod: CPTII,S$GLB,, | Performed by: INTERNAL MEDICINE

## 2023-09-22 PROCEDURE — 3008F PR BODY MASS INDEX (BMI) DOCUMENTED: ICD-10-PCS | Mod: CPTII,S$GLB,, | Performed by: INTERNAL MEDICINE

## 2023-09-22 PROCEDURE — 3080F PR MOST RECENT DIASTOLIC BLOOD PRESSURE >= 90 MM HG: ICD-10-PCS | Mod: CPTII,S$GLB,, | Performed by: INTERNAL MEDICINE

## 2023-09-22 RX ORDER — PANTOPRAZOLE SODIUM 20 MG/1
20 TABLET, DELAYED RELEASE ORAL DAILY
Qty: 60 TABLET | Refills: 1 | Status: SHIPPED | OUTPATIENT
Start: 2023-09-22 | End: 2023-09-26 | Stop reason: ALTCHOICE

## 2023-09-22 RX ORDER — ACETAMINOPHEN 500 MG
500 TABLET ORAL EVERY 6 HOURS PRN
Qty: 120 TABLET | Refills: 1 | Status: SHIPPED | OUTPATIENT
Start: 2023-09-22

## 2023-09-22 NOTE — PROGRESS NOTES
Health Maintenance Due   Topic     Hepatitis C Screening  Consult pcp    Lipid Panel  Consult pcp    COVID-19 Vaccine (1) Not offered at this office    HIV Screening  Consult pcp    TETANUS VACCINE      Influenza Vaccine (1) Pt decline

## 2023-09-22 NOTE — LETTER
September 22, 2023    Avelino Lyle  [address] [facility] [address]             [facility]  [address] [place] [address]  Phone: [phone]  Fax: [phone]   September 22, 2023     Patient: Avelino Lyle   YOB: 1980   Date of Visit: 9/22/2023       To Whom it May Concern:    Avelino Lyle was seen in person on 9/22/2023. He may return to work on 9/26/2023 .    Please excuse him from any classes or work missed.    If you have any questions or concerns, please don't hesitate to call.    Sincerely,         Dariela Upton MD

## 2023-09-22 NOTE — PROGRESS NOTES
Chief Complaint: Abdominal Pain (Pt has been experiencing pain for about a week. Hasn't taken any medication for the pain)      Avelino Lyle  is a 43 y.o. year old with a PMH of  has no past medical history on file. who presents today for    Abdominal pain: Severity 10/10 during episodes, multiple episodes a day, mostly at night/after dinner/moving any position. Going on for a week. Has not taken any meds. Nothing has helped the pain. No new food or meds. No nausea, diarrhea, constipation. Does not look at stool, so not sure if has melena or blood in stool. First time this has happened. Generally getting worse. Took off from work for this. No radiating pain. Pressure like pain. Food relieves the pain. No abdominal surgeries in the past. No congenital issues. Does not smoke, no hx of HTN or DM.     Past Surgical History:   Procedure Laterality Date    FEMUR SURGERY          Family History   Problem Relation Age of Onset    Diabetes Mother     Hypertension Mother         Social History     Socioeconomic History    Marital status:    Occupational History     Employer: [employer]   Tobacco Use    Smoking status: Never   Substance and Sexual Activity    Alcohol use: No    Drug use: No    Sexual activity: Yes     Partners: Female     Social Determinants of Health     Stress: No Stress Concern Present (9/9/2020)    [facility] of Occupational Health - Occupational Stress Questionnaire     Feeling of Stress : Not at all         Current Outpatient Medications:     acetaminophen (TYLENOL) 500 MG tablet, Take 1 tablet (500 mg total) by mouth every 6 (six) hours as needed for Pain., Disp: 120 tablet, Rfl: 1    pantoprazole (PROTONIX) 20 MG tablet, Take 1 tablet (20 mg total) by mouth once daily., Disp: 60 tablet, Rfl: 1     Review of Systems   Constitutional:  Negative for chills, diaphoresis, fever, malaise/fatigue and weight loss.   Respiratory:  Negative for cough and shortness of breath.     Cardiovascular:  Negative for chest pain and palpitations.   Gastrointestinal:  Positive for abdominal pain. Negative for constipation, diarrhea, heartburn and nausea.   Genitourinary:  Negative for dysuria.   Musculoskeletal:  Negative for joint pain and myalgias.   Neurological:  Negative for dizziness and headaches.        Objective:      Vitals:    09/22/23 1301   BP: (!) 118/92   Pulse: 73   Temp: 99 °F (37.2 °C)       Physical Exam  Vitals and nursing note reviewed.   Constitutional:       Appearance: Normal appearance.   HENT:      Head: Normocephalic and atraumatic.   Cardiovascular:      Rate and Rhythm: Normal rate and regular rhythm.   Pulmonary:      Effort: Pulmonary effort is normal.      Breath sounds: Normal breath sounds. No wheezing or rales.   Abdominal:      General: Abdomen is flat. Bowel sounds are normal. There is no distension.      Palpations: Abdomen is soft. There is no mass.      Tenderness: There is abdominal tenderness (epigastric area). There is no guarding or rebound.      Hernia: No hernia is present.   Musculoskeletal:      Right lower leg: No edema.      Left lower leg: No edema.   Skin:     General: Skin is warm and dry.      Findings: No bruising, erythema, lesion or rash.   Neurological:      General: No focal deficit present.      Mental Status: He is alert and oriented to person, place, and time.          Assessment:       1. Epigastric pain    2. Annual physical exam    3. Screening for HIV (human immunodeficiency virus)    4. Need for hepatitis C screening test          Plan:   1. Epigastric pain  Assessment & Plan:  Refer to HPI for details  Acute, unresolved, worsening   Likely 2/2 peptic ulcer vs GERD vs aneurysm? Vs pancreatitis (does not drink EtOH)     - labs: lipase, CMP, H. Pylori   - US abdomen   - protonix OD, advised if sx are persistent to take BID   - follow up next week   - tylenol PRN     Orders:  -     pantoprazole (PROTONIX) 20 MG tablet; Take 1 tablet (20  mg total) by mouth once daily.  Dispense: 60 tablet; Refill: 1  -     acetaminophen (TYLENOL) 500 MG tablet; Take 1 tablet (500 mg total) by mouth every 6 (six) hours as needed for Pain.  Dispense: 120 tablet; Refill: 1  -     US Abdomen Complete; Future; Expected date: 09/22/2023  -     H. PYLORI ANTIBODY, IGG; Future; Expected date: 09/22/2023  -     LIPASE; Future; Expected date: 09/22/2023  -     COMPREHENSIVE METABOLIC PANEL; Future; Expected date: 09/22/2023    2. Annual physical exam  Assessment & Plan:  Was not seen by provided for over two years     Orders:  -     Lipid Panel; Future; Expected date: 09/22/2023  -     Hemoglobin A1C; Future; Expected date: 09/22/2023  -     CBC Auto Differential; Future; Expected date: 09/22/2023    3. Screening for HIV (human immunodeficiency virus)  -     HIV 1/2 Ag/Ab (4th Gen); Future; Expected date: 09/22/2023    4. Need for hepatitis C screening test  -     Hepatitis C Antibody; Future; Expected date: 09/22/2023         Follow up in about 1 week (around 9/29/2023) for f/up abdominal pain .

## 2023-09-22 NOTE — ASSESSMENT & PLAN NOTE
Refer to HPI for details  Acute, unresolved, worsening   Likely 2/2 peptic ulcer vs GERD vs aneurysm? Vs pancreatitis (does not drink EtOH)     - labs: lipase, CMP, H. Pylori   - US abdomen   - protonix OD, advised if sx are persistent to take BID   - follow up next week   - tylenol PRN

## 2023-09-25 ENCOUNTER — PATIENT MESSAGE (OUTPATIENT)
Dept: FAMILY MEDICINE | Facility: CLINIC | Age: 43
End: 2023-09-25
Payer: COMMERCIAL

## 2023-09-25 ENCOUNTER — HOSPITAL ENCOUNTER (OUTPATIENT)
Dept: RADIOLOGY | Facility: HOSPITAL | Age: 43
Discharge: HOME | End: 2023-09-25
Attending: INTERNAL MEDICINE
Payer: COMMERCIAL

## 2023-09-25 DIAGNOSIS — R10.13 EPIGASTRIC PAIN: ICD-10-CM

## 2023-09-25 DIAGNOSIS — R76.8 POSITIVE HELICOBACTER PYLORI SEROLOGY: Primary | ICD-10-CM

## 2023-09-25 PROCEDURE — 76700 US EXAM ABDOM COMPLETE: CPT | Mod: 26,,, | Performed by: RADIOLOGY

## 2023-09-25 PROCEDURE — 76700 US ABDOMEN COMPLETE: ICD-10-PCS | Mod: 26,,, | Performed by: RADIOLOGY

## 2023-09-25 PROCEDURE — 76700 US EXAM ABDOM COMPLETE: CPT | Mod: TC

## 2023-09-25 RX ORDER — LANSOPRAZOLE, AMOXICILLIN, CLARITHROMYCIN 30-500-500
KIT ORAL
Qty: 14 KIT | Refills: 0 | Status: SHIPPED | OUTPATIENT
Start: 2023-09-25 | End: 2023-09-26

## 2023-09-26 ENCOUNTER — TELEPHONE (OUTPATIENT)
Dept: FAMILY MEDICINE | Facility: CLINIC | Age: 43
End: 2023-09-26
Payer: COMMERCIAL

## 2023-09-26 DIAGNOSIS — R76.8 POSITIVE HELICOBACTER PYLORI SEROLOGY: Primary | ICD-10-CM

## 2023-09-26 RX ORDER — LANSOPRAZOLE 30 MG/1
30 CAPSULE, DELAYED RELEASE ORAL 2 TIMES DAILY
Qty: 28 CAPSULE | Refills: 0 | Status: SHIPPED | OUTPATIENT
Start: 2023-09-26 | End: 2023-10-10

## 2023-09-26 RX ORDER — CLARITHROMYCIN 500 MG/1
500 TABLET, FILM COATED ORAL EVERY 12 HOURS
Qty: 28 TABLET | Refills: 0 | Status: SHIPPED | OUTPATIENT
Start: 2023-09-26 | End: 2023-10-10

## 2023-09-26 RX ORDER — AMOXICILLIN 500 MG/1
1000 TABLET, FILM COATED ORAL EVERY 12 HOURS
Qty: 56 TABLET | Refills: 0 | Status: SHIPPED | OUTPATIENT
Start: 2023-09-26 | End: 2023-10-10

## 2023-09-26 NOTE — TELEPHONE ENCOUNTER
----- Message from Amairani Dawson sent at 9/26/2023  7:23 AM CDT -----  Type: Patient Call Back    Who called: self     What is the request in detail: patient stated the medication amoxicillin-clarithromycin-lansoprazole (PREVPAC) 500-500-30 mg combo pack will cost him 900.00 would like to know if it's a different medication that less costly. Please call    Can the clinic reply by [facility]? No    Would the patient rather a call back or a response via [facility]?  call    Best call back number: .[phone] (home)      Additional Information:

## 2023-09-29 ENCOUNTER — PATIENT OUTREACH (OUTPATIENT)
Dept: ADMINISTRATIVE | Facility: OTHER | Age: 43
End: 2023-09-29
Payer: COMMERCIAL

## 2023-09-29 ENCOUNTER — OFFICE VISIT (OUTPATIENT)
Dept: FAMILY MEDICINE | Facility: CLINIC | Age: 43
End: 2023-09-29
Payer: COMMERCIAL

## 2023-09-29 VITALS
HEART RATE: 78 BPM | SYSTOLIC BLOOD PRESSURE: 114 MMHG | TEMPERATURE: 98 F | DIASTOLIC BLOOD PRESSURE: 80 MMHG | WEIGHT: 220.88 LBS | OXYGEN SATURATION: 96 % | BODY MASS INDEX: 32.62 KG/M2

## 2023-09-29 DIAGNOSIS — R76.8 POSITIVE HELICOBACTER PYLORI SEROLOGY: Primary | ICD-10-CM

## 2023-09-29 PROBLEM — R10.13 EPIGASTRIC PAIN: Status: RESOLVED | Noted: 2023-09-22 | Resolved: 2023-09-29

## 2023-09-29 PROCEDURE — 99213 PR OFFICE/OUTPT VISIT, EST, LEVL III, 20-29 MIN: ICD-10-PCS | Mod: S$GLB,,, | Performed by: INTERNAL MEDICINE

## 2023-09-29 PROCEDURE — 3079F PR MOST RECENT DIASTOLIC BLOOD PRESSURE 80-89 MM HG: ICD-10-PCS | Mod: CPTII,S$GLB,, | Performed by: INTERNAL MEDICINE

## 2023-09-29 PROCEDURE — 1159F MED LIST DOCD IN RCRD: CPT | Mod: CPTII,S$GLB,, | Performed by: INTERNAL MEDICINE

## 2023-09-29 PROCEDURE — 99999 PR PBB SHADOW E&M-EST. PATIENT-LVL III: ICD-10-PCS | Mod: PBBFAC,,, | Performed by: INTERNAL MEDICINE

## 2023-09-29 PROCEDURE — 99999 PR PBB SHADOW E&M-EST. PATIENT-LVL III: CPT | Mod: PBBFAC,,, | Performed by: INTERNAL MEDICINE

## 2023-09-29 PROCEDURE — 3008F BODY MASS INDEX DOCD: CPT | Mod: CPTII,S$GLB,, | Performed by: INTERNAL MEDICINE

## 2023-09-29 PROCEDURE — 1159F PR MEDICATION LIST DOCUMENTED IN MEDICAL RECORD: ICD-10-PCS | Mod: CPTII,S$GLB,, | Performed by: INTERNAL MEDICINE

## 2023-09-29 PROCEDURE — 99213 OFFICE O/P EST LOW 20 MIN: CPT | Mod: S$GLB,,, | Performed by: INTERNAL MEDICINE

## 2023-09-29 PROCEDURE — 3079F DIAST BP 80-89 MM HG: CPT | Mod: CPTII,S$GLB,, | Performed by: INTERNAL MEDICINE

## 2023-09-29 PROCEDURE — 1160F RVW MEDS BY RX/DR IN RCRD: CPT | Mod: CPTII,S$GLB,, | Performed by: INTERNAL MEDICINE

## 2023-09-29 PROCEDURE — 3008F PR BODY MASS INDEX (BMI) DOCUMENTED: ICD-10-PCS | Mod: CPTII,S$GLB,, | Performed by: INTERNAL MEDICINE

## 2023-09-29 PROCEDURE — 1160F PR REVIEW ALL MEDS BY PRESCRIBER/CLIN PHARMACIST DOCUMENTED: ICD-10-PCS | Mod: CPTII,S$GLB,, | Performed by: INTERNAL MEDICINE

## 2023-09-29 PROCEDURE — 3074F SYST BP LT 130 MM HG: CPT | Mod: CPTII,S$GLB,, | Performed by: INTERNAL MEDICINE

## 2023-09-29 PROCEDURE — 3074F PR MOST RECENT SYSTOLIC BLOOD PRESSURE < 130 MM HG: ICD-10-PCS | Mod: CPTII,S$GLB,, | Performed by: INTERNAL MEDICINE

## 2023-09-29 NOTE — PATIENT INSTRUCTIONS
Continue taking antibiotics to complete 14 day treatment     We will do a stool test on 11/6/23 (or anytime that week) to make sure H.pylori is cleared from your system.     If it is still positive, we will need to repeat the antibiotics (slightly different regimen).     Please do not take Protonix two weeks prior to the stool testing.

## 2023-09-29 NOTE — ASSESSMENT & PLAN NOTE
Was tested positive for H.pylori in serum. Started on triple therapy and had an excellent response. Has one more week of therapy. Also has Protonix but stopped taking    - counseled patient about H.pylori and to complete the 14 day of therapy   - advised patient that he can take Protonix but to stop two weeks prior to stool testing  - will do H.pylori stool test one month from the last day of abx (11/6/23). Provided patient stool cup and will message to remind him. Patient is aware of this.

## 2023-09-29 NOTE — PROGRESS NOTES
CHW - Initial Contact    This Community Health Worker completed the Social Determinant of Health questionnaire with patient during clinic visit today.    Mr Valdo admits to have hardship in his social health but it is only work related and sees this as no issue.  Follow up required: Y  Follow-up Outreach - Due: 10/13/2023

## 2023-09-29 NOTE — PROGRESS NOTES
Chief Complaint: Follow-up      Avelino Lyle  is a 43 y.o. year old with a PMH of  has no past medical history on file. who presents today for follow up     Epigastric pain: completely resolved now. Patient has taken triple therapy for one week now. Never ended up starting protonix. No side effects.     Patient deferred vaccines today.     Past Surgical History:   Procedure Laterality Date    FEMUR SURGERY          Family History   Problem Relation Age of Onset    Diabetes Mother     Hypertension Mother         Social History     Socioeconomic History    Marital status:    Occupational History     Employer: [employer]   Tobacco Use    Smoking status: Never   Substance and Sexual Activity    Alcohol use: No    Drug use: No    Sexual activity: Yes     Partners: Female     Social Determinants of Health     Financial Resource Strain: Low Risk  (9/29/2023)    Overall Financial Resource Strain (CARDIA)     Difficulty of Paying Living Expenses: Not hard at all   Food Insecurity: No Food Insecurity (9/29/2023)    Hunger Vital Sign     Worried About Running Out of Food in the Last Year: Never true     Ran Out of Food in the Last Year: Never true   Transportation Needs: No Transportation Needs (9/29/2023)    PRAPARE - Transportation     Lack of Transportation (Medical): No     Lack of Transportation (Non-Medical): No   Physical Activity: Sufficiently Active (9/29/2023)    Exercise Vital Sign     Days of Exercise per Week: 5 days     Minutes of Exercise per Session: 60 min   Stress: No Stress Concern Present (9/29/2023)    [place] citizen [place] of Occupational Health - Occupational Stress Questionnaire     Feeling of Stress : Not at all   Social Connections: Socially Isolated (9/29/2023)    Social Connection and Isolation Panel [NHANES]     Frequency of Communication with Friends and Family: More than three times a week     Frequency of Social Gatherings with Friends and Family: Never     Attends Roman Catholic  Services: Never     Active Member of Clubs or Organizations: No     Attends Club or Organization Meetings: Never     Marital Status: Never    Housing Stability: Low Risk  (9/29/2023)    Housing Stability Vital Sign     Unable to Pay for Housing in the Last Year: No     Number of Places Lived in the Last Year: 1     Unstable Housing in the Last Year: No         Current Outpatient Medications:     acetaminophen (TYLENOL) 500 MG tablet, Take 1 tablet (500 mg total) by mouth every 6 (six) hours as needed for Pain., Disp: 120 tablet, Rfl: 1    amoxicillin (AMOXIL) 500 MG Tab, Take 2 tablets (1,000 mg total) by mouth every 12 (twelve) hours. for 14 days, Disp: 56 tablet, Rfl: 0    clarithromycin (BIAXIN) 500 MG tablet, Take 1 tablet (500 mg total) by mouth every 12 (twelve) hours. for 14 days, Disp: 28 tablet, Rfl: 0    lansoprazole (PREVACID) 30 MG capsule, Take 1 capsule (30 mg total) by mouth 2 (two) times daily. for 14 days, Disp: 28 capsule, Rfl: 0     Review of Systems   Constitutional:  Negative for malaise/fatigue.   Gastrointestinal:  Negative for abdominal pain, constipation, diarrhea and nausea.        Objective:      Vitals:    09/29/23 1256   BP: 114/80   Pulse: 78   Temp: 98.2 °F (36.8 °C)       Physical Exam  Vitals and nursing note reviewed.   Constitutional:       Appearance: Normal appearance.   HENT:      Head: Normocephalic and atraumatic.   Cardiovascular:      Rate and Rhythm: Normal rate and regular rhythm.   Pulmonary:      Effort: Pulmonary effort is normal.      Breath sounds: Normal breath sounds. No wheezing or rales.   Abdominal:      General: Bowel sounds are normal.      Palpations: Abdomen is soft.      Tenderness: There is no abdominal tenderness.   Musculoskeletal:      Right lower leg: No edema.      Left lower leg: No edema.   Skin:     General: Skin is warm and dry.   Neurological:      General: No focal deficit present.      Mental Status: He is alert and oriented to person,  place, and time.          Assessment:       1. Positive Helicobacter pylori serology          Plan:   1. Positive Helicobacter pylori serology  Assessment & Plan:  Was tested positive for H.pylori in serum. Started on triple therapy and had an excellent response. Has one more week of therapy. Also has Protonix but stopped taking    - counseled patient about H.pylori and to complete the 14 day of therapy   - advised patient that he can take Protonix but to stop two weeks prior to stool testing  - will do H.pylori stool test one month from the last day of abx (11/6/23). Provided patient stool cup and will message to remind him. Patient is aware of this.    Orders:  -     H. pylori antigen, stool; Future; Expected date: 11/06/2023         No follow-ups on file.

## 2023-10-20 ENCOUNTER — PATIENT OUTREACH (OUTPATIENT)
Dept: ADMINISTRATIVE | Facility: OTHER | Age: 43
End: 2023-10-20
Payer: COMMERCIAL

## 2023-10-20 NOTE — PROGRESS NOTES
CHW - Case Closure    This Community Health Worker spoke to patient via telephone today.   Pt declines assistance with any hardship he may or may not be experiencing in his social health.  denied any additional needs at this time and agrees with episode closure at this time.  Provided patient with Community Health Worker's contact information and encouraged him/her to contact this Community Health Worker if additional needs arise.

## 2023-11-06 ENCOUNTER — PATIENT MESSAGE (OUTPATIENT)
Dept: FAMILY MEDICINE | Facility: CLINIC | Age: 43
End: 2023-11-06
Payer: COMMERCIAL

## 2023-12-25 PROBLEM — Z00.00 ANNUAL PHYSICAL EXAM: Status: RESOLVED | Noted: 2023-09-22 | Resolved: 2023-12-25

## 2025-02-24 ENCOUNTER — OFFICE VISIT (OUTPATIENT)
Dept: FAMILY MEDICINE | Facility: CLINIC | Age: 45
End: 2025-02-24
Payer: COMMERCIAL

## 2025-02-24 VITALS
BODY MASS INDEX: 34.55 KG/M2 | TEMPERATURE: 98 F | RESPIRATION RATE: 16 BRPM | DIASTOLIC BLOOD PRESSURE: 80 MMHG | SYSTOLIC BLOOD PRESSURE: 124 MMHG | HEART RATE: 82 BPM | OXYGEN SATURATION: 98 % | WEIGHT: 233.25 LBS | HEIGHT: 69 IN

## 2025-02-24 DIAGNOSIS — R30.0 DYSURIA: ICD-10-CM

## 2025-02-24 DIAGNOSIS — Z71.89 COUNSELING ON HEALTH PROMOTION AND DISEASE PREVENTION: ICD-10-CM

## 2025-02-24 DIAGNOSIS — R39.9 UTI SYMPTOMS: Primary | ICD-10-CM

## 2025-02-24 LAB
BILIRUB SERPL-MCNC: ABNORMAL MG/DL
BLOOD URINE, POC: ABNORMAL
CLARITY, POC UA: CLEAR
COLOR, POC UA: ABNORMAL
GLUCOSE UR QL STRIP: ABNORMAL
KETONES UR QL STRIP: ABNORMAL
LEUKOCYTE ESTERASE URINE, POC: ABNORMAL
NITRITE, POC UA: ABNORMAL
PH, POC UA: 7
PROTEIN, POC: ABNORMAL
SPECIFIC GRAVITY, POC UA: 1.01
UROBILINOGEN, POC UA: NORMAL

## 2025-02-24 PROCEDURE — 99999 PR PBB SHADOW E&M-EST. PATIENT-LVL III: CPT | Mod: PBBFAC,,,

## 2025-02-24 PROCEDURE — 3008F BODY MASS INDEX DOCD: CPT | Mod: CPTII,S$GLB,,

## 2025-02-24 PROCEDURE — 3079F DIAST BP 80-89 MM HG: CPT | Mod: CPTII,S$GLB,,

## 2025-02-24 PROCEDURE — 81002 URINALYSIS NONAUTO W/O SCOPE: CPT | Mod: S$GLB,,,

## 2025-02-24 PROCEDURE — 87591 N.GONORRHOEAE DNA AMP PROB: CPT

## 2025-02-24 PROCEDURE — 1160F RVW MEDS BY RX/DR IN RCRD: CPT | Mod: CPTII,S$GLB,,

## 2025-02-24 PROCEDURE — 87661 TRICHOMONAS VAGINALIS AMPLIF: CPT

## 2025-02-24 PROCEDURE — 1159F MED LIST DOCD IN RCRD: CPT | Mod: CPTII,S$GLB,,

## 2025-02-24 PROCEDURE — 3074F SYST BP LT 130 MM HG: CPT | Mod: CPTII,S$GLB,,

## 2025-02-24 PROCEDURE — 99213 OFFICE O/P EST LOW 20 MIN: CPT | Mod: S$GLB,,,

## 2025-02-24 PROCEDURE — 81003 URINALYSIS AUTO W/O SCOPE: CPT

## 2025-02-24 RX ORDER — PHENAZOPYRIDINE HYDROCHLORIDE 200 MG/1
200 TABLET, FILM COATED ORAL 3 TIMES DAILY PRN
Qty: 30 TABLET | Refills: 0 | Status: SHIPPED | OUTPATIENT
Start: 2025-02-24 | End: 2025-03-06

## 2025-02-24 RX ORDER — NITROFURANTOIN 25; 75 MG/1; MG/1
100 CAPSULE ORAL 2 TIMES DAILY
Qty: 14 CAPSULE | Refills: 0 | Status: SHIPPED | OUTPATIENT
Start: 2025-02-24 | End: 2025-03-03

## 2025-02-24 NOTE — PROGRESS NOTES
Health Maintenance Due   Topic     Hemoglobin A1c (Diabetic Prevention Screening)      Influenza Vaccine (1)     COVID-19 Vaccine (1 - 2024-25 season)

## 2025-02-24 NOTE — PROGRESS NOTES
HPI     Chief Complaint:  Chief Complaint   Patient presents with    Follow-up     Urine burn on and off started Saturday       Avelino Llye is a 44 y.o. male with multiple medical diagnoses as listed in the medical history and problem list that presents for follow-up    HPI    History of Present Illness    CHIEF COMPLAINT:  Avelino presents today for urinary tract symptoms.    URINARY SYMPTOMS:  He reports burning sensation during urination since Saturday. He denies urinary discharge, hematuria, urinary frequency, and associated pelvic or abdominal pain. He denies fever.     SOCIAL HISTORY:  He primarily consumes cold beverages and green tea throughout the workday instead of water.    SEXUAL HISTORY:  He denies recent sexual activity.   He states last sexual encounter was 2 weeks prior to symptoms.    MEDICAL HISTORY:  His last medical visit was in 2015. Cholesterol was elevated but within acceptable range two years ago.      ROS:  General: -fever, -chills, -fatigue, -weight gain, -weight loss  Eyes: -vision changes, -redness, -discharge  ENT: -ear pain, -nasal congestion, -sore throat  Cardiovascular: -chest pain, -palpitations, -lower extremity edema  Respiratory: -cough, -shortness of breath  Gastrointestinal: -abdominal pain, -nausea, -vomiting, -diarrhea, -constipation, -blood in stool  Genitourinary: -dysuria, -hematuria, -frequency, +painful urination  Musculoskeletal: -joint pain, -muscle pain  Skin: -rash, -lesion  Neurological: -headache, -dizziness, -numbness, -tingling  Psychiatric: -anxiety, -depression, -sleep difficulty             Assessment & Plan     Assessment & Plan    Considering urinary tract infection or kidney stone based on reported symptoms  Ordered urinalysis to confirm diagnosis and guide treatment  Will prescribe antibiotics based on urinalysis results  Noted patient is due for A1c screening to check for blood sugar issues          Problem List Items Addressed This Visit        UTI symptoms - Primary    Relevant Medications    phenazopyridine (PYRIDIUM) 200 MG tablet    nitrofurantoin, macrocrystal-monohydrate, (MACROBID) 100 MG capsule    Other Relevant Orders    POCT urine dipstick without microscope (Completed)    Urinalysis, Reflex to Urine Culture Urine, Clean Catch    Trichomonas vaginalis, RNA, Qual, Urine    C. trachomatis/N. gonorrhoeae by AMP DNA [facility]; Urine  URINARY TRACT INFECTION (SUSPECTED):  - Prescribed pyridium for urinary pain relief.  - Educated the patient that pyridium may cause urine to turn orange or reddish color.  - Performed urinalysis in clinic and sent for culture and sensitivity.  - Instructed the patient to follow up after urinalysis results (15-20 minutes) before leaving clinic.  - Informed the patient that results will be posted in patient portal.  - Avelino reports burning sensation when urinating since Saturday, with no discharge, frequent urination, pelvic pain, abdominal pain, nausea, fever, or blood in urine.  - Considered urinary tract infection or kidney stone as possible diagnoses.  - No side pain or back pain reported.  - Plan to prescribe antibiotics based on urine test results.    Dysuria    Relevant Medications    nitrofurantoin, macrocrystal-monohydrate, (MACROBID) 100 MG capsule    Other Relevant Orders    Trichomonas vaginalis, RNA, Qual, Urine    C. trachomatis/N. gonorrhoeae by AMP DNA [facility]; Urine  URINARY TRACT INFECTION (SUSPECTED):  - Prescribed pyridium for urinary pain relief.  - Educated the patient that pyridium may cause urine to turn orange or reddish color.  - Performed urinalysis in clinic and sent for culture and sensitivity.  - Instructed the patient to follow up after urinalysis results (15-20 minutes) before leaving clinic.  - Informed the patient that results will be posted in patient portal.  - Avelino reports burning sensation when urinating since Saturday, with no discharge, frequent urination, pelvic pain,  abdominal pain, nausea, fever, or blood in urine.  - Considered urinary tract infection or kidney stone as possible diagnoses.  - No side pain or back pain reported.  - Plan to prescribe antibiotics based on urine test results.    Counseling on health promotion and disease prevention  PREVENTIVE CARE:  - Scheduled follow up for annual follow-up with Dr. Pringle for preventive care and lab work.    HYDRATION AND NUTRITION:  - Avelino reports drinking cold drinks all day and not drinking water, which is acknowledged as not ideal.  - Advised the patient that their body, especially kidneys, needs water and that sugary drinks can cause other health issues.  Suggested drinking 70-80 oz of water per day and to decrease that sugary drinks  - Educated patient on importance of water intake for kidney health and recommended increasing water intake throughout the day.  - Discussed potential negative effects of excessive sugary drink consumption and recommended reducing consumption of sugary drinks, including sweetened teas.    LABS:  - Suggested checking A1c to screen for blood sugar issues, which may be related to the patient's drinking habits,  no water and only drinking sodas daily.     HYPERLIPIDEMIA:  - Noted that the patient's cholesterol was last checked 2 years ago and was high.  - Recommend getting labs done, including cholesterol check, as part of an annual check-up.         --------------------------------------------      Health Maintenance:  Health Maintenance         Date Due Completion Date    Hemoglobin A1c (Diabetic Prevention Screening) Never done ---    Influenza Vaccine (1) Never done ---    COVID-19 Vaccine (1 - 2024-25 season) Never done ---    Lipid Panel 09/22/2028 9/22/2023    TETANUS VACCINE 02/04/2033 2/4/2023    RSV Vaccine (Age 60+ and Pregnant patients) (1 - 1-dose 75+ series) 06/13/2056 ---            Health maintenance reviewed and Advised patient on the importance of completing overdue health  "maintenance items    Follow Up:  Follow up if symptoms worsen or fail to improve.    Exam     Review of Systems:  (as noted above)  Review of Systems    Physical Exam:   Physical Exam  Vitals:    02/24/25 1414   BP: 124/80   BP Location: Left arm   Patient Position: Sitting   Pulse: 82   Resp: 16   Temp: 97.8 °F (36.6 °C)   TempSrc: Oral   SpO2: 98%   Weight: 105.8 kg (233 lb 4 oz)   Height: 5' 9" (1.753 m)      Body mass index is 34.44 kg/m².    Physical Exam    General: No acute distress. Well-developed. Well-nourished.  Eyes: EOMI. Sclerae anicteric.  HENT: Normocephalic. Atraumatic. Nares patent.   Cardiovascular: Regular rate.   Respiratory: Normal respiratory effort.  Abdomen:   CVA negative  Musculoskeletal: No  obvious deformity.  Extremities: No lower extremity edema.  Neurological: Alert & oriented x3. No slurred speech. Normal gait.  Psychiatric: Normal mood. Normal affect. Good insight. Good judgment.  Skin: Warm. Dry. No rash.           History     Past Medical History:  History reviewed. No pertinent past medical history.    Past Surgical History:  Past Surgical History:   Procedure Laterality Date    FEMUR SURGERY         Social History:  Social History[1]    Family History:  Family History   Problem Relation Name Age of Onset    Diabetes Mother      Hypertension Mother         Allergies and Medications: (updated and reviewed)  Review of patient's allergies indicates:   Allergen Reactions    Iodine and iodide containing products Anaphylaxis     Current Medications[2]    Patient Care Team:  Marva Valera MD as PCP - General (Family Medicine)         - The patient is given an After Visit Summary that lists all medications with directions, allergies, education, orders placed during this encounter and follow-up instructions.      - I have reviewed the patient's medical information including past medical, family, and social history sections including the medications and allergies.      - We discussed " the patient's current medications.     This note was created by combination of typed  and MModal dictation.  Transcription errors may be present.  If there are any questions, please contact me.     This note was generated with the assistance of ambient listening technology. Verbal consent was obtained by the patient and accompanying visitor(s) for the recording of patient appointment to facilitate this note. I attest to having reviewed and edited the generated note for accuracy, though some syntax or spelling errors may persist. Please contact the author of this note for any clarification.          Anupama Thomas PA-C                      [1]   Social History  Socioeconomic History    Marital status:    Occupational History     Employer: [employer]   Tobacco Use    Smoking status: Never   Substance and Sexual Activity    Alcohol use: No    Drug use: No    Sexual activity: Yes     Partners: Female     Social Drivers of Health     Financial Resource Strain: High Risk (2/23/2025)    Overall Financial Resource Strain (CARDIA)     Difficulty of Paying Living Expenses: Very hard   Food Insecurity: Food Insecurity Present (2/23/2025)    Hunger Vital Sign     Worried About Running Out of Food in the Last Year: Sometimes true     Ran Out of Food in the Last Year: Sometimes true   Transportation Needs: No Transportation Needs (2/23/2025)    PRAPARE - Transportation     Lack of Transportation (Medical): No     Lack of Transportation (Non-Medical): No   Physical Activity: Unknown (2/23/2025)    Exercise Vital Sign     Days of Exercise per Week: 0 days   Stress: No Stress Concern Present (2/23/2025)    [employer] [place] [employer] - Occupational Stress Questionnaire     Feeling of Stress : Only a little   Housing Stability: Low Risk  (2/23/2025)    Housing Stability Vital Sign     Unable to Pay for Housing in the Last Year: No     Homeless in the Last Year: No   [2]   Current  Outpatient Medications   Medication Sig Dispense Refill    acetaminophen (TYLENOL) 500 MG tablet Take 1 tablet (500 mg total) by mouth every 6 (six) hours as needed for Pain. 120 tablet 1    lansoprazole (PREVACID) 30 MG capsule Take 1 capsule (30 mg total) by mouth 2 (two) times daily. for 14 days 28 capsule 0    nitrofurantoin, macrocrystal-monohydrate, (MACROBID) 100 MG capsule Take 1 capsule (100 mg total) by mouth 2 (two) times daily. for 7 days 14 capsule 0    phenazopyridine (PYRIDIUM) 200 MG tablet Take 1 tablet (200 mg total) by mouth 3 (three) times daily as needed for Pain. 30 tablet 0     No current facility-administered medications for this visit.

## 2025-02-25 ENCOUNTER — RESULTS FOLLOW-UP (OUTPATIENT)
Dept: FAMILY MEDICINE | Facility: CLINIC | Age: 45
End: 2025-02-25

## 2025-02-25 LAB
BILIRUB UR QL STRIP: NEGATIVE
CLARITY UR REFRACT.AUTO: CLEAR
COLOR UR AUTO: YELLOW
GLUCOSE UR QL STRIP: NEGATIVE
HGB UR QL STRIP: ABNORMAL
KETONES UR QL STRIP: NEGATIVE
LEUKOCYTE ESTERASE UR QL STRIP: NEGATIVE
NITRITE UR QL STRIP: NEGATIVE
PH UR STRIP: 8 [PH] (ref 5–8)
PROT UR QL STRIP: NEGATIVE
SP GR UR STRIP: 1.02 (ref 1–1.03)
URN SPEC COLLECT METH UR: ABNORMAL

## 2025-02-26 LAB
SPECIMEN SOURCE: NORMAL
T VAGINALIS RRNA SPEC QL NAA+PROBE: NEGATIVE

## 2025-02-28 LAB
C TRACH DNA SPEC QL NAA+PROBE: NOT DETECTED
N GONORRHOEA DNA SPEC QL NAA+PROBE: NOT DETECTED

## 2025-09-04 ENCOUNTER — OFFICE VISIT (OUTPATIENT)
Dept: FAMILY MEDICINE | Facility: CLINIC | Age: 45
End: 2025-09-04
Payer: COMMERCIAL

## 2025-09-04 VITALS
WEIGHT: 241.88 LBS | TEMPERATURE: 98 F | SYSTOLIC BLOOD PRESSURE: 130 MMHG | OXYGEN SATURATION: 98 % | HEART RATE: 87 BPM | DIASTOLIC BLOOD PRESSURE: 82 MMHG | RESPIRATION RATE: 16 BRPM | HEIGHT: 69 IN | BODY MASS INDEX: 35.82 KG/M2

## 2025-09-04 DIAGNOSIS — Z00.00 ANNUAL PHYSICAL EXAM: Primary | ICD-10-CM

## 2025-09-04 DIAGNOSIS — Z13.220 LIPID SCREENING: ICD-10-CM

## 2025-09-04 DIAGNOSIS — Z13.1 SCREENING FOR DIABETES MELLITUS: ICD-10-CM

## 2025-09-04 PROCEDURE — 3075F SYST BP GE 130 - 139MM HG: CPT | Mod: CPTII,S$GLB,,

## 2025-09-04 PROCEDURE — 99999 PR PBB SHADOW E&M-EST. PATIENT-LVL III: CPT | Mod: PBBFAC,,,

## 2025-09-04 PROCEDURE — 3079F DIAST BP 80-89 MM HG: CPT | Mod: CPTII,S$GLB,,

## 2025-09-04 PROCEDURE — 1160F RVW MEDS BY RX/DR IN RCRD: CPT | Mod: CPTII,S$GLB,,

## 2025-09-04 PROCEDURE — 99396 PREV VISIT EST AGE 40-64: CPT | Mod: S$GLB,,,

## 2025-09-04 PROCEDURE — 1159F MED LIST DOCD IN RCRD: CPT | Mod: CPTII,S$GLB,,

## 2025-09-04 PROCEDURE — 3008F BODY MASS INDEX DOCD: CPT | Mod: CPTII,S$GLB,,
